# Patient Record
(demographics unavailable — no encounter records)

---

## 2024-10-17 NOTE — CONSULT LETTER
[Dear  ___] : Dear  [unfilled], [Consult Letter:] : I had the pleasure of evaluating your patient, [unfilled]. [( Thank you for referring [unfilled] for consultation for _____ )] : Thank you for referring [unfilled] for consultation for [unfilled] [Please see my note below.] : Please see my note below. [Consult Closing:] : Thank you very much for allowing me to participate in the care of this patient.  If you have any questions, please do not hesitate to contact me. [Sincerely,] : Sincerely, [FreeTextEntry2] : Dr. Yao Ramirez (Onc Sx/Ref) [FreeTextEntry3] : Lee Nichole MD, FACS , Division of Thoracic Surgery [facility] Thoracic Surgery [facility] Department of Cardiovascular & Thoracic Surgery  [employer] at [facility]

## 2024-10-17 NOTE — REASON FOR VISIT
[de-identified] : EGD, reop right thoracotomy, resection of esophageal diverticulum with epidural, cryoablation, removal of elastofibroma  [de-identified] : 10/08/2024

## 2024-10-17 NOTE — CONSULT LETTER
[Dear  ___] : Dear  [unfilled], [Consult Letter:] : I had the pleasure of evaluating your patient, [unfilled]. [( Thank you for referring [unfilled] for consultation for _____ )] : Thank you for referring [unfilled] for consultation for [unfilled] [Please see my note below.] : Please see my note below. [Consult Closing:] : Thank you very much for allowing me to participate in the care of this patient.  If you have any questions, please do not hesitate to contact me. [Sincerely,] : Sincerely, [FreeTextEntry2] : Dr. Yao Ramirez (Onc Sx/Ref) [FreeTextEntry3] : Lee Nichole MD, FACS , Division of Thoracic Surgery [facility] Thoracic Surgery [facility] Department of Cardiovascular & Thoracic Surgery  [employer] at [facility]

## 2024-10-17 NOTE — ASSESSMENT
[FreeTextEntry1] : Ms. 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 Tony Bales on 08/27/1987 at [facility]). Over the years (10-15 yrs) she has been noticing symptomatic reflux with uncontrolled regurgitation. Recent esophagram revealing distal esophagus diverticulum at the site of her prior surgery. Referred by Dr. Yao Ramirez (Onc Sx) for possible revision of esophageal diverticulectomy.  Manometry on 08/09/2024: - Study consistent with ineffective esophageal motility.  CT Chest w/ Oral Contrast on 08/21/2024 ([facility]): - There is no evidence of suspicious noncalcified nodularity. - There is a 2 mm calcified granuloma in the right lower lobe. - Minor fibrotic/discoid atelectatic changes are seen. - There is evidence of a large diverticulum involving the right lateral aspect of the distal thoracic esophagus. This is seen to measure 4.6 x 4.5 cm on axial images and has a craniocaudal extent of 5.8 cm. - Contrast material as well as retained food/secretions are seen within the diverticulum. - There is mild dilatation of the more proximal portion of the esophagus, also with evidence of retained contrast/food/secretions. Gastroesophageal reflux cannot be excluded. - There is evidence of fairly symmetrical heterogeneous soft tissue density involving the chest wall bilaterally, inferior and deep to the scapulae, the appearance being consistent with Elastofibroma Dorsi. - Within the partially visualized liver are multiple well-defined hypodensities most consistent with cysts, the largest measuring up to 2.2 cm. - There is a 3-4 mm calculus within the incompletely visualized left kidney.  Now s/p EGD, reop right thoracotomy, resection of esophageal diverticulum with epidural, cryoablation, removal of elastofibroma on 10/08/2024. Path....  - Post op course was complicated with hypotension requiring pressor support. Barium swallow test was performed, showing no leak. Patient discharged home on soft diet x 3 days and then pureed under post-op visit.   10/14/2024: Contacted office with c/o nausea, Zofran 4 mg every 12 hours as needed prescribed.   10/17/24: Patient contacted office; Has been increasing activity as tolerated. However, has been experiencing moderate to severe pain to thoracotomy site since last night. Currently taking 500 mg of Tylenol. Wants to avoid narcotic as she is experiencing nausea. No BM within the past 3 days.   Discussed increasing activity as tolerated. Can take up to 1000mg of Tylenol every 6 hours as needed. Can also try staggering with Motrin. Suppository ordered to assist with bowel movement. Refill given on Ondansetron.   CXR on...  Patient is here today for follow up to discuss surgery.  I have independently reviewed the medical records and imaging at the time of this office consultation.   Recommendations reviewed with patient during this office visit, and all questions answered; Patient instructed on the importance of follow up and verbalizes understanding.

## 2024-10-17 NOTE — REASON FOR VISIT
[de-identified] : EGD, reop right thoracotomy, resection of esophageal diverticulum with epidural, cryoablation, removal of elastofibroma  [de-identified] : 10/08/2024

## 2024-10-17 NOTE — ASSESSMENT
[FreeTextEntry1] : Ms. 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 Tony Bales on 08/27/1987 at [facility]). Over the years (10-15 yrs) she has been noticing symptomatic reflux with uncontrolled regurgitation. Recent esophagram revealing distal esophagus diverticulum at the site of her prior surgery. Referred by Dr. Yao Ramirez (Onc Sx) for possible revision of esophageal diverticulectomy.  Manometry on 08/09/2024: - Study consistent with ineffective esophageal motility.  CT Chest w/ Oral Contrast on 08/21/2024 ([facility]): - There is no evidence of suspicious noncalcified nodularity. - There is a 2 mm calcified granuloma in the right lower lobe. - Minor fibrotic/discoid atelectatic changes are seen. - There is evidence of a large diverticulum involving the right lateral aspect of the distal thoracic esophagus. This is seen to measure 4.6 x 4.5 cm on axial images and has a craniocaudal extent of 5.8 cm. - Contrast material as well as retained food/secretions are seen within the diverticulum. - There is mild dilatation of the more proximal portion of the esophagus, also with evidence of retained contrast/food/secretions. Gastroesophageal reflux cannot be excluded. - There is evidence of fairly symmetrical heterogeneous soft tissue density involving the chest wall bilaterally, inferior and deep to the scapulae, the appearance being consistent with Elastofibroma Dorsi. - Within the partially visualized liver are multiple well-defined hypodensities most consistent with cysts, the largest measuring up to 2.2 cm. - There is a 3-4 mm calculus within the incompletely visualized left kidney.  Now s/p EGD, reop right thoracotomy, resection of esophageal diverticulum with epidural, cryoablation, removal of elastofibroma on 10/08/2024. Path....  - Post op course was complicated with hypotension requiring pressor support. Barium swallow test was performed, showing no leak. Patient discharged home on soft diet x 3 days and then pureed under post-op visit.   10/14/2024: Contacted office with c/o nausea, Zofran 4 mg every 12 hours as needed prescribed.   10/17/24: Patient contacted office; Has been increasing activity as tolerated. However, has been experiencing moderate to severe pain to thoracotomy site since last night. Currently taking 500 mg of Tylenol. Wants to avoid narcotic as she is experiencing nausea. No BM within the past 3 days.   Discussed increasing activity as tolerated. Can take up to 1000mg of Tylenol every 6 hours as needed. Can also try staggering with Motrin. Suppository ordered to assist with bowel movement. Refill given on Ondansetron.   CXR on...  Patient is here today for follow up to discuss surgery.  I have independently reviewed the medical records and imaging at the time of this office consultation.   Recommendations reviewed with patient during this office visit, and all questions answered; Patient instructed on the importance of follow up and verbalizes understanding.

## 2024-10-23 NOTE — PHYSICAL EXAM
[] : no respiratory distress [Auscultation Breath Sounds / Voice Sounds] : lungs were clear to auscultation bilaterally [Heart Rate And Rhythm] : heart rate was normal and rhythm regular [Heart Sounds] : normal S1 and S2 [Heart Sounds Gallop] : no gallops [Murmurs] : no murmurs [Heart Sounds Pericardial Friction Rub] : no pericardial rub [Clean] : clean [Healing Well] : healing well

## 2024-10-24 NOTE — CONSULT LETTER
[FreeTextEntry2] : Dr. Yao Ramirez (Onc Sx/Ref) [FreeTextEntry3] : Lee Nichole MD, FACS , Division of Thoracic Surgery [facility] Thoracic Surgery [facility] Department of Cardiovascular & Thoracic Surgery  [employer] at [facility]

## 2024-10-24 NOTE — ASSESSMENT
[FreeTextEntry1] : Ms. 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 Tony Bales on 08/27/1987 at [facility]). Over the years (10-15 yrs) she has been noticing symptomatic reflux with uncontrolled regurgitation. Recent esophagram revealing distal esophagus diverticulum at the site of her prior surgery. Referred by Dr. Yao Ramirez (Onc Sx) for possible revision of esophageal diverticulectomy.  Manometry on 08/09/2024: - Study consistent with ineffective esophageal motility.  CT Chest w/ Oral Contrast on 08/21/2024 ([facility]): - There is no evidence of suspicious noncalcified nodularity. - There is a 2 mm calcified granuloma in the right lower lobe. - Minor fibrotic/discoid atelectatic changes are seen. - There is evidence of a large diverticulum involving the right lateral aspect of the distal thoracic esophagus. This is seen to measure 4.6 x 4.5 cm on axial images and has a craniocaudal extent of 5.8 cm. - Contrast material as well as retained food/secretions are seen within the diverticulum. - There is mild dilatation of the more proximal portion of the esophagus, also with evidence of retained contrast/food/secretions. Gastroesophageal reflux cannot be excluded. - There is evidence of fairly symmetrical heterogeneous soft tissue density involving the chest wall bilaterally, inferior and deep to the scapulae, the appearance being consistent with Elastofibroma Dorsi. - Within the partially visualized liver are multiple well-defined hypodensities most consistent with cysts, the largest measuring up to 2.2 cm. - There is a 3-4 mm calculus within the incompletely visualized left kidney.  Now s/p EGD, reop right thoracotomy, resection of esophageal diverticulum with epidural, cryoablation, removal of elastofibroma on 10/08/2024.  *Path of right chest wall excision reveals Elastofibroma. IHC is negative for SOX10 and Beta catenin. 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 *Path of esophagus diverticulum resection reveals Squamous lined fibromuscular tissue consistent with diverticulum (clinically esophageal).  - Post op course was complicated with hypotension requiring pressor support. Barium swallow test was performed, showing no leak. Patient discharged home on soft diet x 3 days and then pureed under post-op visit.   10/14/2024: Contacted office with c/o nausea, Zofran 4 mg every 12 hours as needed prescribed.   10/17/24: Patient contacted office; Has been increasing activity as tolerated. However, has been experiencing moderate to severe pain to thoracotomy site since last night. Currently taking 500 mg of Tylenol. Wants to avoid narcotic as she is experiencing nausea. No BM within the past 3 days. Discussed increasing activity as tolerated. Can take up to 1000mg of Tylenol every 6 hours as needed. Can also try staggering with Motrin. Suppository ordered to assist with bowel movement. Refill given on Ondansetron.   10/19/24: Seen at [facility] for chest pain.  CT Angio Chest on 10/19/2024: - No pulmonary embolism. - Status post resection of lower esophageal diverticulum. - Persistent contrast within the lower half of the esophagus suggests poor esophageal motility and/or reflux.  ECHO on 10/20/2024: - Normal global left ventricular systolic function. - EF 55%.  - Mildly dilated left atrium. - Mildly dilated right atrium.  CXR on 10/21/2024 ([facility]): - Blunting of the right costophrenic angle suggestive for right-sided pleural effusion, which was not present on prior examination, otherwise unremarkable examination.   Patient is here today for post-op visit. Overall, she reports to be feeling well. Tolerating pureed diet.  Denies any regurgitation, heartburn, difficulty swallowing, chest pain, shortness of breath, cough, hemoptysis, fever, or chills.  Surgical incisions appear to be healing well, no sign of infection noted.  I have independently reviewed the medical records and imaging at the time of this office consultation. Path reviewed, consistent with clinical esophageal. Overall, she is doing well, discussed advancing diet to soft for three week. I would like her to return to clinic in 3 weeks for clinical follow up, she is agreeable.   Recommendations reviewed with patient during this office visit, and all questions answered; Patient instructed on the importance of follow up and verbalizes understanding.    I, MEGAN Hidalgo, personally performed the evaluation and management (E/M) services for this established patient. That E/M includes conducting the examination, assessing all new/exacerbated conditions, and establishing a new plan of care. Today, my ACP, Camden Agarwal NP, was here to observe my evaluation and management services for this new problem/exacerbated condition to be followed going forward.

## 2024-10-24 NOTE — ASSESSMENT
[FreeTextEntry1] : Ms. 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 Tony Bales on 08/27/1987 at [facility]). Over the years (10-15 yrs) she has been noticing symptomatic reflux with uncontrolled regurgitation. Recent esophagram revealing distal esophagus diverticulum at the site of her prior surgery. Referred by Dr. Yao Ramirez (Onc Sx) for possible revision of esophageal diverticulectomy.  Manometry on 08/09/2024: - Study consistent with ineffective esophageal motility.  CT Chest w/ Oral Contrast on 08/21/2024 ([facility]): - There is no evidence of suspicious noncalcified nodularity. - There is a 2 mm calcified granuloma in the right lower lobe. - Minor fibrotic/discoid atelectatic changes are seen. - There is evidence of a large diverticulum involving the right lateral aspect of the distal thoracic esophagus. This is seen to measure 4.6 x 4.5 cm on axial images and has a craniocaudal extent of 5.8 cm. - Contrast material as well as retained food/secretions are seen within the diverticulum. - There is mild dilatation of the more proximal portion of the esophagus, also with evidence of retained contrast/food/secretions. Gastroesophageal reflux cannot be excluded. - There is evidence of fairly symmetrical heterogeneous soft tissue density involving the chest wall bilaterally, inferior and deep to the scapulae, the appearance being consistent with Elastofibroma Dorsi. - Within the partially visualized liver are multiple well-defined hypodensities most consistent with cysts, the largest measuring up to 2.2 cm. - There is a 3-4 mm calculus within the incompletely visualized left kidney.  Now s/p EGD, reop right thoracotomy, resection of esophageal diverticulum with epidural, cryoablation, removal of elastofibroma on 10/08/2024.  *Path of right chest wall excision reveals Elastofibroma. IHC is negative for SOX10 and Beta catenin. 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 *Path of esophagus diverticulum resection reveals Squamous lined fibromuscular tissue consistent with diverticulum (clinically esophageal).  - Post op course was complicated with hypotension requiring pressor support. Barium swallow test was performed, showing no leak. Patient discharged home on soft diet x 3 days and then pureed under post-op visit.   10/14/2024: Contacted office with c/o nausea, Zofran 4 mg every 12 hours as needed prescribed.   10/17/24: Patient contacted office; Has been increasing activity as tolerated. However, has been experiencing moderate to severe pain to thoracotomy site since last night. Currently taking 500 mg of Tylenol. Wants to avoid narcotic as she is experiencing nausea. No BM within the past 3 days. Discussed increasing activity as tolerated. Can take up to 1000mg of Tylenol every 6 hours as needed. Can also try staggering with Motrin. Suppository ordered to assist with bowel movement. Refill given on Ondansetron.   10/19/24: Seen at [facility] for chest pain.  CT Angio Chest on 10/19/2024: - No pulmonary embolism. - Status post resection of lower esophageal diverticulum. - Persistent contrast within the lower half of the esophagus suggests poor esophageal motility and/or reflux.  ECHO on 10/20/2024: - Normal global left ventricular systolic function. - EF 55%.  - Mildly dilated left atrium. - Mildly dilated right atrium.  CXR on 10/21/2024 ([facility]): - Blunting of the right costophrenic angle suggestive for right-sided pleural effusion, which was not present on prior examination, otherwise unremarkable examination.   10/23/2024: Seen in office for post-op visit. Tolerating pureed diet. Surgical incision healing well, no sign of infection. Discussed advancing diet to soft for three week. I would like her to return to clinic in 3 weeks for clinical follow up  Patient is here today for follow up.   I have independently reviewed the medical records and imaging at the time of this office consultation.   Recommendations reviewed with patient during this office visit, and all questions answered; Patient instructed on the importance of follow up and verbalizes understanding.

## 2024-10-24 NOTE — CONSULT LETTER
[FreeTextEntry2] : Dr. Yao Ramirez (Onc Sx/Ref) [FreeTextEntry3] : Lee Nichole MD, FACS , Division of Thoracic Surgery [facility] Thoracic Surgery [facility] Department of Cardiovascular & Thoracic Surgery  [employer] at [facility]

## 2024-10-24 NOTE — CONSULT LETTER
[FreeTextEntry2] : Dr. Yao Ramirez (Onc Sx/Ref) [FreeTextEntry3] : Lee Nichole MD, FACS , Division of Thoracic Surgery [facility] Thoracic Surgery [facility] Department of Cardiovascular & Thoracic Surgery  [employer] at [facility]

## 2024-10-24 NOTE — CONSULT LETTER
[Dear  ___] : Dear  [unfilled], [Consult Letter:] : I had the pleasure of evaluating your patient, [unfilled]. [( Thank you for referring [unfilled] for consultation for _____ )] : Thank you for referring [unfilled] for consultation for [unfilled] [Please see my note below.] : Please see my note below. [Consult Closing:] : Thank you very much for allowing me to participate in the care of this patient.  If you have any questions, please do not hesitate to contact me. [Sincerely,] : Sincerely, [FreeTextEntry2] : Dr. Yao Ramirez (Onc Sx/Ref) [FreeTextEntry3] : Lee Nichole MD, FACS , Division of Thoracic Surgery [facility] Thoracic Surgery [facility] Department of Cardiovascular & Thoracic Surgery  [employer] at [facility]

## 2024-10-24 NOTE — ASSESSMENT
[FreeTextEntry1] : Ms. 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 Tony Bales on 08/27/1987 at [facility]). Over the years (10-15 yrs) she has been noticing symptomatic reflux with uncontrolled regurgitation. Recent esophagram revealing distal esophagus diverticulum at the site of her prior surgery. Referred by Dr. Yao Ramirez (Onc Sx) for possible revision of esophageal diverticulectomy.  Manometry on 08/09/2024: - Study consistent with ineffective esophageal motility.  CT Chest w/ Oral Contrast on 08/21/2024 ([facility]): - There is no evidence of suspicious noncalcified nodularity. - There is a 2 mm calcified granuloma in the right lower lobe. - Minor fibrotic/discoid atelectatic changes are seen. - There is evidence of a large diverticulum involving the right lateral aspect of the distal thoracic esophagus. This is seen to measure 4.6 x 4.5 cm on axial images and has a craniocaudal extent of 5.8 cm. - Contrast material as well as retained food/secretions are seen within the diverticulum. - There is mild dilatation of the more proximal portion of the esophagus, also with evidence of retained contrast/food/secretions. Gastroesophageal reflux cannot be excluded. - There is evidence of fairly symmetrical heterogeneous soft tissue density involving the chest wall bilaterally, inferior and deep to the scapulae, the appearance being consistent with Elastofibroma Dorsi. - Within the partially visualized liver are multiple well-defined hypodensities most consistent with cysts, the largest measuring up to 2.2 cm. - There is a 3-4 mm calculus within the incompletely visualized left kidney.  Now s/p EGD, reop right thoracotomy, resection of esophageal diverticulum with epidural, cryoablation, removal of elastofibroma on 10/08/2024.  *Path of right chest wall excision reveals Elastofibroma. IHC is negative for SOX10 and Beta catenin. 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 *Path of esophagus diverticulum resection reveals Squamous lined fibromuscular tissue consistent with diverticulum (clinically esophageal).  - Post op course was complicated with hypotension requiring pressor support. Barium swallow test was performed, showing no leak. Patient discharged home on soft diet x 3 days and then pureed under post-op visit.   10/14/2024: Contacted office with c/o nausea, Zofran 4 mg every 12 hours as needed prescribed.   10/17/24: Patient contacted office; Has been increasing activity as tolerated. However, has been experiencing moderate to severe pain to thoracotomy site since last night. Currently taking 500 mg of Tylenol. Wants to avoid narcotic as she is experiencing nausea. No BM within the past 3 days. Discussed increasing activity as tolerated. Can take up to 1000mg of Tylenol every 6 hours as needed. Can also try staggering with Motrin. Suppository ordered to assist with bowel movement. Refill given on Ondansetron.   10/19/24: Seen at [facility] for chest pain.  CT Angio Chest on 10/19/2024: - No pulmonary embolism. - Status post resection of lower esophageal diverticulum. - Persistent contrast within the lower half of the esophagus suggests poor esophageal motility and/or reflux.  ECHO on 10/20/2024: - Normal global left ventricular systolic function. - EF 55%.  - Mildly dilated left atrium. - Mildly dilated right atrium.  CXR on 10/21/2024 ([facility]): - Blunting of the right costophrenic angle suggestive for right-sided pleural effusion, which was not present on prior examination, otherwise unremarkable examination.   Patient is here today for post-op visit. Overall, she reports to be feeling well. Tolerating pureed diet.  Denies any regurgitation, heartburn, difficulty swallowing, chest pain, shortness of breath, cough, hemoptysis, fever, or chills.  Surgical incisions appear to be healing well, no sign of infection noted.  I have independently reviewed the medical records and imaging at the time of this office consultation. Path reviewed, consistent with clinical esophageal. Overall, she is doing well, discussed advancing diet to soft for three week. I would like her to return to clinic in 3 weeks for clinical follow up, she is agreeable.   Recommendations reviewed with patient during this office visit, and all questions answered; Patient instructed on the importance of follow up and verbalizes understanding.    I, MEGAN Hidalgo, personally performed the evaluation and management (E/M) services for this established patient. That E/M includes conducting the examination, assessing all new/exacerbated conditions, and establishing a new plan of care. Today, my ACP, Camden Agarwal NP, was here to observe my evaluation and management services for this new problem/exacerbated condition to be followed going forward.

## 2024-10-24 NOTE — REASON FOR VISIT
[de-identified] : EGD, reop right thoracotomy, resection of esophageal diverticulum with epidural, cryoablation, removal of elastofibroma  [de-identified] : 10/08/2024

## 2024-10-24 NOTE — ASSESSMENT
[FreeTextEntry1] : Ms. 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 Tony Bales on 08/27/1987 at [facility]). Over the years (10-15 yrs) she has been noticing symptomatic reflux with uncontrolled regurgitation. Recent esophagram revealing distal esophagus diverticulum at the site of her prior surgery. Referred by Dr. Yao Ramirez (Onc Sx) for possible revision of esophageal diverticulectomy.  Manometry on 08/09/2024: - Study consistent with ineffective esophageal motility.  CT Chest w/ Oral Contrast on 08/21/2024 ([facility]): - There is no evidence of suspicious noncalcified nodularity. - There is a 2 mm calcified granuloma in the right lower lobe. - Minor fibrotic/discoid atelectatic changes are seen. - There is evidence of a large diverticulum involving the right lateral aspect of the distal thoracic esophagus. This is seen to measure 4.6 x 4.5 cm on axial images and has a craniocaudal extent of 5.8 cm. - Contrast material as well as retained food/secretions are seen within the diverticulum. - There is mild dilatation of the more proximal portion of the esophagus, also with evidence of retained contrast/food/secretions. Gastroesophageal reflux cannot be excluded. - There is evidence of fairly symmetrical heterogeneous soft tissue density involving the chest wall bilaterally, inferior and deep to the scapulae, the appearance being consistent with Elastofibroma Dorsi. - Within the partially visualized liver are multiple well-defined hypodensities most consistent with cysts, the largest measuring up to 2.2 cm. - There is a 3-4 mm calculus within the incompletely visualized left kidney.  Now s/p EGD, reop right thoracotomy, resection of esophageal diverticulum with epidural, cryoablation, removal of elastofibroma on 10/08/2024.  *Path of right chest wall excision reveals Elastofibroma. IHC is negative for SOX10 and Beta catenin. 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 *Path of esophagus diverticulum resection reveals Squamous lined fibromuscular tissue consistent with diverticulum (clinically esophageal).  - Post op course was complicated with hypotension requiring pressor support. Barium swallow test was performed, showing no leak. Patient discharged home on soft diet x 3 days and then pureed under post-op visit.   10/14/2024: Contacted office with c/o nausea, Zofran 4 mg every 12 hours as needed prescribed.   10/17/24: Patient contacted office; Has been increasing activity as tolerated. However, has been experiencing moderate to severe pain to thoracotomy site since last night. Currently taking 500 mg of Tylenol. Wants to avoid narcotic as she is experiencing nausea. No BM within the past 3 days. Discussed increasing activity as tolerated. Can take up to 1000mg of Tylenol every 6 hours as needed. Can also try staggering with Motrin. Suppository ordered to assist with bowel movement. Refill given on Ondansetron.   10/19/24: Seen at [facility] for chest pain.  CT Angio Chest on 10/19/2024: - No pulmonary embolism. - Status post resection of lower esophageal diverticulum. - Persistent contrast within the lower half of the esophagus suggests poor esophageal motility and/or reflux.  ECHO on 10/20/2024: - Normal global left ventricular systolic function. - EF 55%.  - Mildly dilated left atrium. - Mildly dilated right atrium.  CXR on 10/21/2024 ([facility]): - Blunting of the right costophrenic angle suggestive for right-sided pleural effusion, which was not present on prior examination, otherwise unremarkable examination.   Patient is here today for post-op visit. Overall, she reports to be feeling well. Tolerating pureed diet.  Denies any regurgitation, heartburn, difficulty swallowing, chest pain, shortness of breath, cough, hemoptysis, fever, or chills.  Surgical incisions appear to be healing well, no sign of infection noted.  I have independently reviewed the medical records and imaging at the time of this office consultation. Path reviewed, consistent with clinical esophageal. Overall, she is doing well, discussed advancing diet to soft for three week. I would like her to return to clinic in 3 weeks for clinical follow up, she is agreeable.   Recommendations reviewed with patient during this office visit, and all questions answered; Patient instructed on the importance of follow up and verbalizes understanding.    I, MEGAN Hidalgo, personally performed the evaluation and management (E/M) services for this established patient. That E/M includes conducting the examination, assessing all new/exacerbated conditions, and establishing a new plan of care. Today, my ACP, Camden Agarwal NP, was here to observe my evaluation and management services for this new problem/exacerbated condition to be followed going forward.

## 2024-10-24 NOTE — REASON FOR VISIT
[de-identified] : EGD, reop right thoracotomy, resection of esophageal diverticulum with epidural, cryoablation, removal of elastofibroma  [de-identified] : 10/08/2024

## 2024-10-24 NOTE — REASON FOR VISIT
[de-identified] : EGD, reop right thoracotomy, resection of esophageal diverticulum with epidural, cryoablation, removal of elastofibroma  [de-identified] : 10/08/2024

## 2024-10-24 NOTE — REASON FOR VISIT
[de-identified] : EGD, reop right thoracotomy, resection of esophageal diverticulum with epidural, cryoablation, removal of elastofibroma  [de-identified] : 10/08/2024

## 2024-11-13 NOTE — REASON FOR VISIT
[de-identified] : EGD, reop right thoracotomy, resection of esophageal diverticulum with epidural, cryoablation, removal of elastofibroma  [de-identified] : 10/08/2024

## 2024-11-13 NOTE — PHYSICAL EXAM
[] : no respiratory distress [Auscultation Breath Sounds / Voice Sounds] : lungs were clear to auscultation bilaterally [Heart Sounds] : normal S1 and S2 [Heart Rate And Rhythm] : heart rate was normal and rhythm regular [Heart Sounds Gallop] : no gallops [Murmurs] : no murmurs [Heart Sounds Pericardial Friction Rub] : no pericardial rub [Clean] : clean [Dry] : dry [Healing Well] : healing well

## 2024-11-13 NOTE — ASSESSMENT
[FreeTextEntry1] : Ms. 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 Tony Bales on 08/27/1987 at [facility]). Over the years (10-15 yrs) she has been noticing symptomatic reflux with uncontrolled regurgitation. Recent esophagram revealing distal esophagus diverticulum at the site of her prior surgery. Referred by Dr. Yao Ramirez (Onc Sx) for possible revision of esophageal diverticulectomy.  Manometry on 08/09/2024: - Study consistent with ineffective esophageal motility.  CT Chest w/ Oral Contrast on 08/21/2024 ([facility]): - There is no evidence of suspicious noncalcified nodularity. - There is a 2 mm calcified granuloma in the right lower lobe. - Minor fibrotic/discoid atelectatic changes are seen. - There is evidence of a large diverticulum involving the right lateral aspect of the distal thoracic esophagus. This is seen to measure 4.6 x 4.5 cm on axial images and has a craniocaudal extent of 5.8 cm. - Contrast material as well as retained food/secretions are seen within the diverticulum. - There is mild dilatation of the more proximal portion of the esophagus, also with evidence of retained contrast/food/secretions. Gastroesophageal reflux cannot be excluded. - There is evidence of fairly symmetrical heterogeneous soft tissue density involving the chest wall bilaterally, inferior and deep to the scapulae, the appearance being consistent with Elastofibroma Dorsi. - Within the partially visualized liver are multiple well-defined hypodensities most consistent with cysts, the largest measuring up to 2.2 cm. - There is a 3-4 mm calculus within the incompletely visualized left kidney.  Now s/p EGD, reop right thoracotomy, resection of esophageal diverticulum with epidural, cryoablation, removal of elastofibroma on 10/08/2024.  *Path of right chest wall excision reveals Elastofibroma. IHC is negative for SOX10 and Beta catenin. 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 *Path of esophagus diverticulum resection reveals Squamous lined fibromuscular tissue consistent with diverticulum (clinically esophageal).  - Post op course was complicated with hypotension requiring pressor support. Barium swallow test was performed, showing no leak. Patient discharged home on soft diet x 3 days and then pureed under post-op visit.   10/14/2024: Contacted office with c/o nausea, Zofran 4 mg every 12 hours as needed prescribed.   10/17/24: Patient contacted office; Has been increasing activity as tolerated. However, has been experiencing moderate to severe pain to thoracotomy site since last night. Currently taking 500 mg of Tylenol. Wants to avoid narcotic as she is experiencing nausea. No BM within the past 3 days. Discussed increasing activity as tolerated. Can take up to 1000mg of Tylenol every 6 hours as needed. Can also try staggering with Motrin. Suppository ordered to assist with bowel movement. Refill given on Ondansetron.   10/19/24: Seen at [facility] for chest pain.  CT Angio Chest on 10/19/2024: - No pulmonary embolism. - Status post resection of lower esophageal diverticulum. - Persistent contrast within the lower half of the esophagus suggests poor esophageal motility and/or reflux.  ECHO on 10/20/2024: - Normal global left ventricular systolic function. - EF 55%.  - Mildly dilated left atrium. - Mildly dilated right atrium.  CXR on 10/21/2024 ([facility]): - Blunting of the right costophrenic angle suggestive for right-sided pleural effusion, which was not present on prior examination, otherwise unremarkable examination.   10/23/2024: Seen in office for post-op visit. Tolerating pureed diet. Surgical incision healing well, no sign of infection. Discussed advancing diet to soft for three week. I would like her to return to clinic in 3 weeks for clinical follow up  Patient is here today for follow up. Overall, he/she reports to be feeling well. Denies any chest pain, shortness of breath, cough, regurgitation, or acid reflux. Tolerating soft diet. Discussed advancing diet as tolerated. Advised to eat small frequent meals and chew well. Additionally, discussed taking Pepcid once a day vs twice daily. She is agreeable. I would like her to return to clinic in 3 months for clinical follow up.   Recommendations reviewed with patient during this office visit, and all questions answered; Patient instructed on the importance of follow up and verbalizes understanding.    I, MEGAN Hidalgo, personally performed the evaluation and management (E/M) services for this established patient. That E/M includes conducting the examination, assessing all new/exacerbated conditions, and establishing a new plan of care. Today, my ACP, Camden Agarwal np, was here to observe my evaluation and management services for this new problem/exacerbated condition to be followed going forward.

## 2024-11-13 NOTE — CONSULT LETTER
[FreeTextEntry2] : Dr. Yao Ramirez (Onc Sx/Ref) [FreeTextEntry3] : Lee Nichole MD, FACS , Division of Thoracic Surgery [facility] Thoracic Surgery [facility] Department of Cardiovascular & Thoracic Surgery  [employer] at [facility]

## 2024-11-13 NOTE — ASSESSMENT
[FreeTextEntry1] : Ms. 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 Tony Bales on 08/27/1987 at [facility]). Over the years (10-15 yrs) she has been noticing symptomatic reflux with uncontrolled regurgitation. Recent esophagram revealing distal esophagus diverticulum at the site of her prior surgery. Referred by Dr. Yao Ramirez (Onc Sx) for possible revision of esophageal diverticulectomy.  Manometry on 08/09/2024: - Study consistent with ineffective esophageal motility.  CT Chest w/ Oral Contrast on 08/21/2024 ([facility]): - There is no evidence of suspicious noncalcified nodularity. - There is a 2 mm calcified granuloma in the right lower lobe. - Minor fibrotic/discoid atelectatic changes are seen. - There is evidence of a large diverticulum involving the right lateral aspect of the distal thoracic esophagus. This is seen to measure 4.6 x 4.5 cm on axial images and has a craniocaudal extent of 5.8 cm. - Contrast material as well as retained food/secretions are seen within the diverticulum. - There is mild dilatation of the more proximal portion of the esophagus, also with evidence of retained contrast/food/secretions. Gastroesophageal reflux cannot be excluded. - There is evidence of fairly symmetrical heterogeneous soft tissue density involving the chest wall bilaterally, inferior and deep to the scapulae, the appearance being consistent with Elastofibroma Dorsi. - Within the partially visualized liver are multiple well-defined hypodensities most consistent with cysts, the largest measuring up to 2.2 cm. - There is a 3-4 mm calculus within the incompletely visualized left kidney.  Now s/p EGD, reop right thoracotomy, resection of esophageal diverticulum with epidural, cryoablation, removal of elastofibroma on 10/08/2024.  *Path of right chest wall excision reveals Elastofibroma. IHC is negative for SOX10 and Beta catenin. 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 *Path of esophagus diverticulum resection reveals Squamous lined fibromuscular tissue consistent with diverticulum (clinically esophageal).  - Post op course was complicated with hypotension requiring pressor support. Barium swallow test was performed, showing no leak. Patient discharged home on soft diet x 3 days and then pureed under post-op visit.   10/14/2024: Contacted office with c/o nausea, Zofran 4 mg every 12 hours as needed prescribed.   10/17/24: Patient contacted office; Has been increasing activity as tolerated. However, has been experiencing moderate to severe pain to thoracotomy site since last night. Currently taking 500 mg of Tylenol. Wants to avoid narcotic as she is experiencing nausea. No BM within the past 3 days. Discussed increasing activity as tolerated. Can take up to 1000mg of Tylenol every 6 hours as needed. Can also try staggering with Motrin. Suppository ordered to assist with bowel movement. Refill given on Ondansetron.   10/19/24: Seen at [facility] for chest pain.  CT Angio Chest on 10/19/2024: - No pulmonary embolism. - Status post resection of lower esophageal diverticulum. - Persistent contrast within the lower half of the esophagus suggests poor esophageal motility and/or reflux.  ECHO on 10/20/2024: - Normal global left ventricular systolic function. - EF 55%.  - Mildly dilated left atrium. - Mildly dilated right atrium.  CXR on 10/21/2024 ([facility]): - Blunting of the right costophrenic angle suggestive for right-sided pleural effusion, which was not present on prior examination, otherwise unremarkable examination.   10/23/2024: Seen in office for post-op visit. Tolerating pureed diet. Surgical incision healing well, no sign of infection. Discussed advancing diet to soft for three week. I would like her to return to clinic in 3 weeks for clinical follow up  Patient is here today for follow up. Overall, he/she reports to be feeling well. Denies any chest pain, shortness of breath, cough, regurgitation, or acid reflux. Tolerating soft diet. Discussed advancing diet as tolerated. Advised to eat small frequent meals and chew well. Additionally, discussed taking Pepcid once a day vs twice daily. She is agreeable. I would like her to return to clinic in 3 months for clinical follow up.   Recommendations reviewed with patient during this office visit, and all questions answered; Patient instructed on the importance of follow up and verbalizes understanding.    I, MEGAN Hidalgo, personally performed the evaluation and management (E/M) services for this established patient. That E/M includes conducting the examination, assessing all new/exacerbated conditions, and establishing a new plan of care. Today, my ACP, Camden Agarwal np, was here to observe my evaluation and management services for this new problem/exacerbated condition to be followed going forward.

## 2024-11-13 NOTE — REASON FOR VISIT
[de-identified] : EGD, reop right thoracotomy, resection of esophageal diverticulum with epidural, cryoablation, removal of elastofibroma  [de-identified] : 10/08/2024

## 2024-11-13 NOTE — REASON FOR VISIT
[de-identified] : EGD, reop right thoracotomy, resection of esophageal diverticulum with epidural, cryoablation, removal of elastofibroma  [de-identified] : 10/08/2024

## 2024-11-13 NOTE — CONSULT LETTER
[FreeTextEntry2] : Dr. Yao Ramirez (Onc Sx/Ref) [FreeTextEntry3] : Lee Nichole MD, FACS , Division of Thoracic Surgery [facility] Thoracic Surgery [facility] Department of Cardiovascular & Thoracic Surgery  [employer] at [facility]

## 2024-11-13 NOTE — ASSESSMENT
[FreeTextEntry1] : Ms. 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 Tony Bales on 08/27/1987 at [facility]). Over the years (10-15 yrs) she has been noticing symptomatic reflux with uncontrolled regurgitation. Recent esophagram revealing distal esophagus diverticulum at the site of her prior surgery. Referred by Dr. Yao Ramirez (Onc Sx) for possible revision of esophageal diverticulectomy.  Manometry on 08/09/2024: - Study consistent with ineffective esophageal motility.  CT Chest w/ Oral Contrast on 08/21/2024 ([facility]): - There is no evidence of suspicious noncalcified nodularity. - There is a 2 mm calcified granuloma in the right lower lobe. - Minor fibrotic/discoid atelectatic changes are seen. - There is evidence of a large diverticulum involving the right lateral aspect of the distal thoracic esophagus. This is seen to measure 4.6 x 4.5 cm on axial images and has a craniocaudal extent of 5.8 cm. - Contrast material as well as retained food/secretions are seen within the diverticulum. - There is mild dilatation of the more proximal portion of the esophagus, also with evidence of retained contrast/food/secretions. Gastroesophageal reflux cannot be excluded. - There is evidence of fairly symmetrical heterogeneous soft tissue density involving the chest wall bilaterally, inferior and deep to the scapulae, the appearance being consistent with Elastofibroma Dorsi. - Within the partially visualized liver are multiple well-defined hypodensities most consistent with cysts, the largest measuring up to 2.2 cm. - There is a 3-4 mm calculus within the incompletely visualized left kidney.  Now s/p EGD, reop right thoracotomy, resection of esophageal diverticulum with epidural, cryoablation, removal of elastofibroma on 10/08/2024.  *Path of right chest wall excision reveals Elastofibroma. IHC is negative for SOX10 and Beta catenin. 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 *Path of esophagus diverticulum resection reveals Squamous lined fibromuscular tissue consistent with diverticulum (clinically esophageal).  - Post op course was complicated with hypotension requiring pressor support. Barium swallow test was performed, showing no leak. Patient discharged home on soft diet x 3 days and then pureed under post-op visit.   10/14/2024: Contacted office with c/o nausea, Zofran 4 mg every 12 hours as needed prescribed.   10/17/24: Patient contacted office; Has been increasing activity as tolerated. However, has been experiencing moderate to severe pain to thoracotomy site since last night. Currently taking 500 mg of Tylenol. Wants to avoid narcotic as she is experiencing nausea. No BM within the past 3 days. Discussed increasing activity as tolerated. Can take up to 1000mg of Tylenol every 6 hours as needed. Can also try staggering with Motrin. Suppository ordered to assist with bowel movement. Refill given on Ondansetron.   10/19/24: Seen at [facility] for chest pain.  CT Angio Chest on 10/19/2024: - No pulmonary embolism. - Status post resection of lower esophageal diverticulum. - Persistent contrast within the lower half of the esophagus suggests poor esophageal motility and/or reflux.  ECHO on 10/20/2024: - Normal global left ventricular systolic function. - EF 55%.  - Mildly dilated left atrium. - Mildly dilated right atrium.  CXR on 10/21/2024 ([facility]): - Blunting of the right costophrenic angle suggestive for right-sided pleural effusion, which was not present on prior examination, otherwise unremarkable examination.   10/23/2024: Seen in office for post-op visit. Tolerating pureed diet. Surgical incision healing well, no sign of infection. Discussed advancing diet to soft for three week. I would like her to return to clinic in 3 weeks for clinical follow up  Patient is here today for follow up. Overall, he/she reports to be feeling well. Denies any chest pain, shortness of breath, cough, regurgitation, or acid reflux. Tolerating soft diet. Discussed advancing diet as tolerated. Advised to eat small frequent meals and chew well. Additionally, discussed taking Pepcid once a day vs twice daily. She is agreeable. I would like her to return to clinic in 3 months for clinical follow up.   Recommendations reviewed with patient during this office visit, and all questions answered; Patient instructed on the importance of follow up and verbalizes understanding.    I, MEGAN Hidalgo, personally performed the evaluation and management (E/M) services for this established patient. That E/M includes conducting the examination, assessing all new/exacerbated conditions, and establishing a new plan of care. Today, my ACP, Camden Agarwal np, was here to observe my evaluation and management services for this new problem/exacerbated condition to be followed going forward.

## 2024-11-13 NOTE — CONSULT LETTER
[FreeTextEntry2] : Dr. Yao Ramirez (Onc Sx/Ref) [FreeTextEntry3] : Lee Nichole MD, FACS , Division of Thoracic Surgery [facility] Thoracic Surgery [facility] Department of Cardiovascular & Thoracic Surgery  [employer] at [facility]

## 2025-02-05 NOTE — HISTORY OF PRESENT ILLNESS
[FreeTextEntry1] : Ms. 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 Tony Bales on 08/27/1987 at [facility]). Over the years (10-15 yrs) she has been noticing symptomatic reflux with uncontrolled regurgitation. Recent esophagram revealing distal esophagus diverticulum at the site of her prior surgery. Referred by Dr. Yao Ramirez (Onc Sx) for possible revision of esophageal diverticulectomy.  Manometry on 08/09/2024: - Study consistent with ineffective esophageal motility.  CT Chest w/ Oral Contrast on 08/21/2024 ([facility]): - There is no evidence of suspicious noncalcified nodularity. - There is a 2 mm calcified granuloma in the right lower lobe. - Minor fibrotic/discoid atelectatic changes are seen. - There is evidence of a large diverticulum involving the right lateral aspect of the distal thoracic esophagus. This is seen to measure 4.6 x 4.5 cm on axial images and has a craniocaudal extent of 5.8 cm. - Contrast material as well as retained food/secretions are seen within the diverticulum. - There is mild dilatation of the more proximal portion of the esophagus, also with evidence of retained contrast/food/secretions. Gastroesophageal reflux cannot be excluded. - There is evidence of fairly symmetrical heterogeneous soft tissue density involving the chest wall bilaterally, inferior and deep to the scapulae, the appearance being consistent with Elastofibroma Dorsi. - Within the partially visualized liver are multiple well-defined hypodensities most consistent with cysts, the largest measuring up to 2.2 cm. - There is a 3-4 mm calculus within the incompletely visualized left kidney.  Now s/p EGD, reop right thoracotomy, resection of esophageal diverticulum with epidural, cryoablation, removal of elastofibroma on 10/08/2024. *Path of right chest wall excision reveals Elastofibroma. IHC is negative for SOX10 and Beta catenin. 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 *Path of esophagus diverticulum resection reveals Squamous lined fibromuscular tissue consistent with diverticulum (clinically esophageal).  - Post op course was complicated with hypotension requiring pressor support. Barium swallow test was performed, showing no leak. Patient discharged home on soft diet x 3 days and then pureed under post-op visit.  10/14/2024: Contacted office with c/o nausea, Zofran 4 mg every 12 hours as needed prescribed.  10/17/24: Patient contacted office; Has been increasing activity as tolerated. However, has been experiencing moderate to severe pain to thoracotomy site since last night. Currently taking 500 mg of Tylenol. Wants to avoid narcotic as she is experiencing nausea. No BM within the past 3 days. Discussed increasing activity as tolerated. Can take up to 1000mg of Tylenol every 6 hours as needed. Can also try staggering with Motrin. Suppository ordered to assist with bowel movement. Refill given on Ondansetron.  10/19/24: Seen at [facility] for chest pain.  CT Angio Chest on 10/19/2024: - No pulmonary embolism. - Status post resection of lower esophageal diverticulum. - Persistent contrast within the lower half of the esophagus suggests poor esophageal motility and/or reflux.  ECHO on 10/20/2024: - Normal global left ventricular systolic function. - EF 55%. - Mildly dilated left atrium. - Mildly dilated right atrium.  CXR on 10/21/2024 ([facility]): - Blunting of the right costophrenic angle suggestive for right-sided pleural effusion, which was not present on prior examination, otherwise unremarkable examination.  10/23/2024: Seen in office for post-op visit. Tolerating pureed diet. Surgical incision healing well, no sign of infection. Discussed advancing diet to soft for three week. I would like her to return to clinic in 3 weeks for clinical follow up.  11/13/2024: Tolerating soft diet. Discussed advancing diet as tolerated. Advised to eat small frequent meals and chew well. Additionally, discussed taking Pepcid once a day vs twice daily. She is agreeable. I would like her to return to clinic in 3 months for clinical follow up.  CXR on 01/31/2025 ([facility]): - No acute cardiopulmonary pathology. Status post resection of the right seventh rib.   She presents today for clinical follow up. Reports intermittent tingling and pinching sensation near thoracotomy site. Tolerating regular diet.

## 2025-02-05 NOTE — CONSULT LETTER
[FreeTextEntry2] : Dr. Yao Ramirez (Onc Sx/Ref) [FreeTextEntry3] : Lee Nichole MD, FACS , Division of Thoracic Surgery [facility] Thoracic Surgery [facility] Department of Cardiovascular & Thoracic Surgery  [employer] at [facility]

## 2025-02-05 NOTE — ASSESSMENT
[FreeTextEntry1] : Ms. 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 Tony Bales on 08/27/1987 at [facility]). Over the years (10-15 yrs) she has been noticing symptomatic reflux with uncontrolled regurgitation. Recent esophagram revealing distal esophagus diverticulum at the site of her prior surgery. Referred by Dr. Yao Ramirez (Onc Sx) for possible revision of esophageal diverticulectomy.  Now s/p EGD, reop right thoracotomy, resection of esophageal diverticulum with epidural, cryoablation, removal of elastofibroma on 10/08/2024.  *Path of right chest wall excision reveals Elastofibroma. IHC is negative for SOX10 and Beta catenin. 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 *Path of esophagus diverticulum resection reveals Squamous lined fibromuscular tissue consistent with diverticulum (clinically esophageal).  She presents today for clinical follow up.   Patient is here today for follow up.   I have independently reviewed the medical records and imaging at the time of this office consultation. CXR with stable post-op changes. She is overall doing well post-operatively. Discussed neuropathic pain is expected and will resolve over time. Advised to eat small frequent meals and chew well. No further thoracic surgery intervention at this time. RTC as needed, she is agreeable.   Recommendations reviewed with patient during this office visit, and all questions answered; Patient instructed on the importance of follow up and verbalizes understanding.    I, MEGAN Hidalgo, personally performed the evaluation and management (E/M) services for this established patient. That E/M includes conducting the examination, assessing all new/exacerbated conditions, and establishing a new plan of care. Today, my ACP, Camden Agarwal NP, was here to observe my evaluation and management services for this new problem/exacerbated condition to be followed going forward.

## 2025-02-05 NOTE — HISTORY OF PRESENT ILLNESS
[FreeTextEntry1] : Ms. 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 Tony Bales on 08/27/1987 at [facility]). Over the years (10-15 yrs) she has been noticing symptomatic reflux with uncontrolled regurgitation. Recent esophagram revealing distal esophagus diverticulum at the site of her prior surgery. Referred by Dr. Yao Ramirez (Onc Sx) for possible revision of esophageal diverticulectomy.  Manometry on 08/09/2024: - Study consistent with ineffective esophageal motility.  CT Chest w/ Oral Contrast on 08/21/2024 ([facility]): - There is no evidence of suspicious noncalcified nodularity. - There is a 2 mm calcified granuloma in the right lower lobe. - Minor fibrotic/discoid atelectatic changes are seen. - There is evidence of a large diverticulum involving the right lateral aspect of the distal thoracic esophagus. This is seen to measure 4.6 x 4.5 cm on axial images and has a craniocaudal extent of 5.8 cm. - Contrast material as well as retained food/secretions are seen within the diverticulum. - There is mild dilatation of the more proximal portion of the esophagus, also with evidence of retained contrast/food/secretions. Gastroesophageal reflux cannot be excluded. - There is evidence of fairly symmetrical heterogeneous soft tissue density involving the chest wall bilaterally, inferior and deep to the scapulae, the appearance being consistent with Elastofibroma Dorsi. - Within the partially visualized liver are multiple well-defined hypodensities most consistent with cysts, the largest measuring up to 2.2 cm. - There is a 3-4 mm calculus within the incompletely visualized left kidney.  Now s/p EGD, reop right thoracotomy, resection of esophageal diverticulum with epidural, cryoablation, removal of elastofibroma on 10/08/2024. *Path of right chest wall excision reveals Elastofibroma. IHC is negative for SOX10 and Beta catenin. 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 *Path of esophagus diverticulum resection reveals Squamous lined fibromuscular tissue consistent with diverticulum (clinically esophageal).  - Post op course was complicated with hypotension requiring pressor support. Barium swallow test was performed, showing no leak. Patient discharged home on soft diet x 3 days and then pureed under post-op visit.  10/14/2024: Contacted office with c/o nausea, Zofran 4 mg every 12 hours as needed prescribed.  10/17/24: Patient contacted office; Has been increasing activity as tolerated. However, has been experiencing moderate to severe pain to thoracotomy site since last night. Currently taking 500 mg of Tylenol. Wants to avoid narcotic as she is experiencing nausea. No BM within the past 3 days. Discussed increasing activity as tolerated. Can take up to 1000mg of Tylenol every 6 hours as needed. Can also try staggering with Motrin. Suppository ordered to assist with bowel movement. Refill given on Ondansetron.  10/19/24: Seen at [facility] for chest pain.  CT Angio Chest on 10/19/2024: - No pulmonary embolism. - Status post resection of lower esophageal diverticulum. - Persistent contrast within the lower half of the esophagus suggests poor esophageal motility and/or reflux.  ECHO on 10/20/2024: - Normal global left ventricular systolic function. - EF 55%. - Mildly dilated left atrium. - Mildly dilated right atrium.  CXR on 10/21/2024 ([facility]): - Blunting of the right costophrenic angle suggestive for right-sided pleural effusion, which was not present on prior examination, otherwise unremarkable examination.  10/23/2024: Seen in office for post-op visit. Tolerating pureed diet. Surgical incision healing well, no sign of infection. Discussed advancing diet to soft for three week. I would like her to return to clinic in 3 weeks for clinical follow up.  11/13/2024: Tolerating soft diet. Discussed advancing diet as tolerated. Advised to eat small frequent meals and chew well. Additionally, discussed taking Pepcid once a day vs twice daily. She is agreeable. I would like her to return to clinic in 3 months for clinical follow up.  CXR on 01/31/2025 ([facility]): - No acute cardiopulmonary pathology. Status post resection of the right seventh rib.   She presents today for clinical follow up. Reports intermittent tingling and pinching sensation near thoracotomy site. Tolerating regular diet.

## 2025-02-05 NOTE — CONSULT LETTER
[FreeTextEntry2] : Dr. Yao Ramirez (Onc Sx/Ref) [FreeTextEntry3] : Lee Nichole MD, FACS , Division of Thoracic Surgery [facility] Thoracic Surgery [facility] Department of Cardiovascular & Thoracic Surgery  [employer] at [facility]

## 2025-02-05 NOTE — ASSESSMENT
[FreeTextEntry1] : Ms. 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 Tony Bales on 08/27/1987 at [facility]). Over the years (10-15 yrs) she has been noticing symptomatic reflux with uncontrolled regurgitation. Recent esophagram revealing distal esophagus diverticulum at the site of her prior surgery. Referred by Dr. Yao Ramirez (Onc Sx) for possible revision of esophageal diverticulectomy.  Now s/p EGD, reop right thoracotomy, resection of esophageal diverticulum with epidural, cryoablation, removal of elastofibroma on 10/08/2024.  *Path of right chest wall excision reveals Elastofibroma. IHC is negative for SOX10 and Beta catenin. 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 *Path of esophagus diverticulum resection reveals Squamous lined fibromuscular tissue consistent with diverticulum (clinically esophageal).  She presents today for clinical follow up.   Patient is here today for follow up.   I have independently reviewed the medical records and imaging at the time of this office consultation. CXR with stable post-op changes. She is overall doing well post-operatively. Discussed neuropathic pain is expected and will resolve over time. Advised to eat small frequent meals and chew well. No further thoracic surgery intervention at this time. RTC as needed, she is agreeable.   Recommendations reviewed with patient during this office visit, and all questions answered; Patient instructed on the importance of follow up and verbalizes understanding.    I, MEGAN Hidalgo, personally performed the evaluation and management (E/M) services for this established patient. That E/M includes conducting the examination, assessing all new/exacerbated conditions, and establishing a new plan of care. Today, my ACP, Camden Agarwal NP, was here to observe my evaluation and management services for this new problem/exacerbated condition to be followed going forward.